# Patient Record
Sex: MALE | Race: WHITE | NOT HISPANIC OR LATINO | URBAN - METROPOLITAN AREA
[De-identification: names, ages, dates, MRNs, and addresses within clinical notes are randomized per-mention and may not be internally consistent; named-entity substitution may affect disease eponyms.]

---

## 2021-07-21 ENCOUNTER — OFFICE VISIT (OUTPATIENT)
Dept: INTERNAL MEDICINE | Facility: CLINIC | Age: 26
End: 2021-07-21

## 2021-07-21 ENCOUNTER — ANCILLARY PROCEDURE (OUTPATIENT)
Dept: GENERAL RADIOLOGY | Facility: CLINIC | Age: 26
End: 2021-07-21
Attending: INTERNAL MEDICINE

## 2021-07-21 VITALS
RESPIRATION RATE: 16 BRPM | OXYGEN SATURATION: 98 % | WEIGHT: 183.3 LBS | DIASTOLIC BLOOD PRESSURE: 80 MMHG | TEMPERATURE: 97.7 F | HEART RATE: 74 BPM | SYSTOLIC BLOOD PRESSURE: 120 MMHG

## 2021-07-21 DIAGNOSIS — M25.531 RIGHT WRIST PAIN: Primary | ICD-10-CM

## 2021-07-21 DIAGNOSIS — M25.531 RIGHT WRIST PAIN: ICD-10-CM

## 2021-07-21 PROCEDURE — 99203 OFFICE O/P NEW LOW 30 MIN: CPT | Performed by: INTERNAL MEDICINE

## 2021-07-21 PROCEDURE — 73110 X-RAY EXAM OF WRIST: CPT | Mod: RT | Performed by: RADIOLOGY

## 2021-07-21 NOTE — PATIENT INSTRUCTIONS
- Take ibuprofen, 600 mg three times daily (WITH FOOD), only for next 3-4 days.    - Ice the wrist regularly

## 2021-07-21 NOTE — PROGRESS NOTES
Assessment & Plan     Right wrist pain  X-ray today unrevealing.  Trial of scheduled ibuprofen to control inflammation. [facility] referral thereafter if ineffective.  - XR Wrist Right G/E 3 Views; Future             See Patient Instructions    No follow-ups on file.    Gaston Lou MD  [facility]alez is a 26 year old who presents for the following health issues     HPI     Musculoskeletal problem/pain  Onset/Duration: July 1st  Description  Location: hand and wrist - right  Joint Swelling: YES  Redness: no  Pain: YES  Warmth: no  Intensity:  2/10 sitting when using the hand it is a 8/10  Progression of Symptoms:  When using it is constant  Accompanying signs and symptoms:   Fevers: no  Numbness/tingling/weakness: YES- a little weakness  History  Trauma to the area: YES- plays softball injury from sliding to the base  Recent illness:  no  Previous similar problem: no  Previous evaluation:  no  Precipitating or alleviating factors:  Aggravating factors include: can't move fingers back and hurts to bend wrist. Sharp pain outside of hand to pinky finger.  Therapies tried and outcome: nothing        Review of Systems   Constitutional, HEENT, cardiovascular, pulmonary, GI, , musculoskeletal, neuro, skin, endocrine and psych systems are negative, except as otherwise noted.      Objective    /80 (BP Location: Left arm, Patient Position: Chair, Cuff Size: Adult Large)   Pulse 74   Temp 97.7  F (36.5  C) (Temporal)   Resp 16   Wt 83.1 kg (183 lb 4.8 oz)   SpO2 98%   There is no height or weight on file to calculate BMI.  Physical Exam   GENERAL: healthy, alert and no distress  NECK: no adenopathy, no asymmetry, masses, or scars and thyroid normal to palpation  RESP: lungs clear to auscultation - no rales, rhonchi or wheezes  CV: regular rate and rhythm, normal S1 S2, no S3 or S4, no murmur, click or rub, no peripheral edema and peripheral pulses  strong  ABDOMEN: soft, nontender, no hepatosplenomegaly, no masses and bowel sounds normal  MS: no gross musculoskeletal defects noted, no edema